# Patient Record
Sex: MALE | Race: BLACK OR AFRICAN AMERICAN | NOT HISPANIC OR LATINO | URBAN - METROPOLITAN AREA
[De-identification: names, ages, dates, MRNs, and addresses within clinical notes are randomized per-mention and may not be internally consistent; named-entity substitution may affect disease eponyms.]

---

## 2019-05-16 ENCOUNTER — EMERGENCY (EMERGENCY)
Facility: HOSPITAL | Age: 34
LOS: 1 days | Discharge: HOME | End: 2019-05-16
Admitting: EMERGENCY MEDICINE
Payer: MEDICAID

## 2019-05-16 VITALS
DIASTOLIC BLOOD PRESSURE: 89 MMHG | HEART RATE: 82 BPM | TEMPERATURE: 98 F | SYSTOLIC BLOOD PRESSURE: 149 MMHG | RESPIRATION RATE: 16 BRPM | OXYGEN SATURATION: 98 %

## 2019-05-16 DIAGNOSIS — Z79.1 LONG TERM (CURRENT) USE OF NON-STEROIDAL ANTI-INFLAMMATORIES (NSAID): ICD-10-CM

## 2019-05-16 DIAGNOSIS — B34.9 VIRAL INFECTION, UNSPECIFIED: ICD-10-CM

## 2019-05-16 DIAGNOSIS — S06.9X9A UNSPECIFIED INTRACRANIAL INJURY WITH LOSS OF CONSCIOUSNESS OF UNSPECIFIED DURATION, INITIAL ENCOUNTER: Chronic | ICD-10-CM

## 2019-05-16 DIAGNOSIS — R51 HEADACHE: ICD-10-CM

## 2019-05-16 DIAGNOSIS — Z79.899 OTHER LONG TERM (CURRENT) DRUG THERAPY: ICD-10-CM

## 2019-05-16 LAB — S PYO AG SPEC QL IA: NEGATIVE — SIGNIFICANT CHANGE UP

## 2019-05-16 PROCEDURE — 99283 EMERGENCY DEPT VISIT LOW MDM: CPT

## 2019-05-16 RX ORDER — IBUPROFEN 200 MG
600 TABLET ORAL ONCE
Refills: 0 | Status: COMPLETED | OUTPATIENT
Start: 2019-05-16 | End: 2019-05-16

## 2019-05-16 RX ORDER — ACETAMINOPHEN 500 MG
650 TABLET ORAL ONCE
Refills: 0 | Status: COMPLETED | OUTPATIENT
Start: 2019-05-16 | End: 2019-05-16

## 2019-05-16 RX ADMIN — Medication 600 MILLIGRAM(S): at 18:21

## 2019-05-16 RX ADMIN — Medication 650 MILLIGRAM(S): at 18:21

## 2019-05-16 NOTE — ED PROVIDER NOTE - CLINICAL SUMMARY MEDICAL DECISION MAKING FREE TEXT BOX
35 y/o M presents to ED c/o symptoms consistent with viral URI.  Pt well appearing, afebrile.  Pt given Tylenol and ibuprofen for pain . Pt advised supportive care.  Hydrate well. 35 y/o M presents to ED c/o symptoms consistent with viral URI.  Pt well appearing, afebrile.  Pt given Tylenol and ibuprofen for pain. Pt swallowing without difficulty.Pt advised supportive care.  Hydrate well.

## 2019-05-16 NOTE — ED PROVIDER NOTE - OBJECTIVE STATEMENT
35 y/o M presents to ED chills, headache, sore throat, body aches and fatigue x 1 day.  He is undomiciled and states he lost his ibuprofen.  Pt denies fevers, chest pain, abd pain, n/v/d, weakness, numbness, dysuria, hematuria.

## 2019-05-16 NOTE — ED ADULT NURSE NOTE - NSIMPLEMENTINTERV_GEN_ALL_ED
Implemented All Universal Safety Interventions:  [place] to call system. Call bell, personal items and telephone within reach. Instruct patient to call for assistance. Room bathroom lighting operational. Non-slip footwear when patient is off stretcher. Physically safe environment: no spills, clutter or unnecessary equipment. Stretcher in lowest position, wheels locked, appropriate side rails in place.

## 2019-05-16 NOTE — ED ADULT NURSE NOTE - OBJECTIVE STATEMENT
pt is a 33 y/o male presents to the ED with chills, headache, sore throat, body aches and fatigue x 1 day.

## 2019-05-18 LAB
CULTURE RESULTS: SIGNIFICANT CHANGE UP
SPECIMEN SOURCE: SIGNIFICANT CHANGE UP

## 2019-05-21 ENCOUNTER — EMERGENCY (EMERGENCY)
Facility: HOSPITAL | Age: 34
LOS: 1 days | Discharge: HOME | End: 2019-05-21
Admitting: EMERGENCY MEDICINE
Payer: MEDICAID

## 2019-05-21 VITALS
RESPIRATION RATE: 16 BRPM | OXYGEN SATURATION: 95 % | TEMPERATURE: 99 F | WEIGHT: 190.92 LBS | HEART RATE: 83 BPM | SYSTOLIC BLOOD PRESSURE: 133 MMHG | DIASTOLIC BLOOD PRESSURE: 79 MMHG

## 2019-05-21 DIAGNOSIS — S06.9X9A UNSPECIFIED INTRACRANIAL INJURY WITH LOSS OF CONSCIOUSNESS OF UNSPECIFIED DURATION, INITIAL ENCOUNTER: Chronic | ICD-10-CM

## 2019-05-21 PROCEDURE — 99283 EMERGENCY DEPT VISIT LOW MDM: CPT

## 2019-05-21 RX ORDER — CEPHALEXIN 500 MG
1 CAPSULE ORAL
Qty: 14 | Refills: 0
Start: 2019-05-21 | End: 2019-05-27

## 2019-05-21 RX ORDER — IBUPROFEN 200 MG
600 TABLET ORAL ONCE
Refills: 0 | Status: COMPLETED | OUTPATIENT
Start: 2019-05-21 | End: 2019-05-21

## 2019-05-21 RX ORDER — CEPHALEXIN 500 MG
500 CAPSULE ORAL ONCE
Refills: 0 | Status: COMPLETED | OUTPATIENT
Start: 2019-05-21 | End: 2019-05-21

## 2019-05-21 RX ADMIN — Medication 600 MILLIGRAM(S): at 21:21

## 2019-05-21 RX ADMIN — Medication 500 MILLIGRAM(S): at 21:21

## 2019-05-21 NOTE — ED PROVIDER NOTE - NSFOLLOWUPINSTRUCTIONS_ED_ALL_ED_FT
take antibiotics as prescribed. use warm compresses.   return for worsening or changing symptoms     1)  PLEASE FOLLOW-UP WITH YOUR PRIMARY CARE DOCTOR OR REFERRED SPECIALIST IN 1-2 DAYS.     2)  BRING ALL PAPERWORK FROM TODAY'S EMERGENCY ROOM  VISIT TO YOUR FOLLOW-UP VISIT.  IF YOU DO NOT HAVE A PRIMARY CARE DOCTOR PLEASE REFER TO THE OFFICE/CLINIC INFORMATION GIVEN ABOVE.  YOU MAY ALSO CALL [phone] AND ASK FOR MS. NATHANIEL ALICEA.  SHE CAN HELP YOU MAKE A FOLLOW-UP APPOINTMENT.  HER HOURS ARE 11AM-7PM MONDAY - FRIDAY.    3) PLEASE RETURN TO THE ER IMMEDIATELY OR CALL 911 FOR ANY HIGH FEVER, TROUBLE BREATHING, CHEST PAIN, WEAKNESS, VOMITING, SEVERE PAIN, OR ANY OTHER CONCERNS.

## 2019-05-21 NOTE — ED PROVIDER NOTE - OBJECTIVE STATEMENT
34 year old male with no PMhx, undomicilied presents with small folliculitis under left arm pit x one week. reports not getting better. no drainage. no fever. not currently taking antibiotics for it.   Denies sore throat, cough, SOB, CP, palpitations, wheezing, abdominal pain, N/V/D/C, change in urinary/bowel function, dysuria, hematuria, flank pain, malaise, rash, HA, and dizziness.  No recent travel or sick contact noted.

## 2019-05-21 NOTE — ED PROVIDER NOTE - NS ED ROS FT
· CONSTITUTIONAL: no fever and no chills.  · CARDIOVASCULAR: normal rate and rhythm, no chest pain and no edema.  · RESPIRATORY: no chest pain, no cough, and no shortness of breath.  · GASTROINTESTINAL: no abdominal pain, no bloating, no constipation, no diarrhea, no nausea and no vomiting.  · MUSCULOSKELETAL: no back pain, no musculoskeletal pain, no neck pain, and no weakness.  · SKIN: +folliculitis, no abrasions, no jaundice, no lesions, no pruritis, and no rashes.  · NEURO: no loss of consciousness, no gait abnormality, no headache, no sensory deficits, and no weakness.  · PSYCHIATRIC: no known mental health issues.

## 2019-05-21 NOTE — ED PROVIDER NOTE - PHYSICAL EXAMINATION
VITAL SIGNS: I have reviewed nursing notes and confirm.  CONSTITUTIONAL: Well-developed; well-nourished; in no acute distress.  SKIN: +folliculitis under left arm pit, no surrounding erythema, no drainage, no fluctuance   OTHER: Skin is warm and dry, no acute rash.  HEAD: Normocephalic; atraumatic.  EYES: PERRL, EOM intact; conjunctiva and sclera clear.  ENT: No nasal discharge; airway clear.  NECK: Supple; non tender.  CARD: S1, S2 normal; no murmurs, gallops, or rubs. Regular rate and rhythm.  RESP: No wheezes, rales or rhonchi.  ABD: Normal bowel sounds; soft; non-distended; non-tender;  no palpable or pulsating mass; no hepatosplenomegaly.  EXT: Normal ROM. No clubbing, cyanosis or edema.  NEURO: Alert, oriented. Grossly unremarkable.  PSYCH: Cooperative, appropriate.

## 2019-05-24 DIAGNOSIS — L73.9 FOLLICULAR DISORDER, UNSPECIFIED: ICD-10-CM

## 2022-01-23 NOTE — ED PROVIDER NOTE - CLINICAL SUMMARY MEDICAL DECISION MAKING FREE TEXT BOX
left arm pit folliculitis will give PO abx, bacitracin. warm compresses. no indication for I&D. history of BPH   - hold home tamsulosin 0.4mg daily, oxybutynin 5mg daily, and Finasteride 5mg daily given low BPs for now. Restart accordingly

## 2024-07-10 NOTE — ED PROVIDER NOTE - SKIN NEGATIVE STATEMENT, MLM
Render In Strict Bullet Format?: No Initiate Treatment: fluocinonide 0.05 % topical cream BID Detail Level: Simple no abrasions, no jaundice, no lesions, no pruritis, and no rashes.